# Patient Record
Sex: FEMALE | Race: BLACK OR AFRICAN AMERICAN | NOT HISPANIC OR LATINO | URBAN - METROPOLITAN AREA
[De-identification: names, ages, dates, MRNs, and addresses within clinical notes are randomized per-mention and may not be internally consistent; named-entity substitution may affect disease eponyms.]

---

## 2017-09-29 ENCOUNTER — EMERGENCY (EMERGENCY)
Facility: HOSPITAL | Age: 67
LOS: 1 days | Discharge: HOME | End: 2017-09-29
Attending: EMERGENCY MEDICINE | Admitting: EMERGENCY MEDICINE
Payer: MEDICARE

## 2017-09-29 VITALS
TEMPERATURE: 98 F | RESPIRATION RATE: 18 BRPM | DIASTOLIC BLOOD PRESSURE: 77 MMHG | HEART RATE: 67 BPM | OXYGEN SATURATION: 100 % | SYSTOLIC BLOOD PRESSURE: 153 MMHG

## 2017-09-29 VITALS
DIASTOLIC BLOOD PRESSURE: 61 MMHG | SYSTOLIC BLOOD PRESSURE: 91 MMHG | TEMPERATURE: 98 F | RESPIRATION RATE: 16 BRPM | HEART RATE: 73 BPM

## 2017-09-29 LAB
ALBUMIN SERPL ELPH-MCNC: 4 G/DL — SIGNIFICANT CHANGE UP (ref 3.3–5)
ALP SERPL-CCNC: 72 U/L — SIGNIFICANT CHANGE UP (ref 40–120)
ALT FLD-CCNC: 19 U/L — SIGNIFICANT CHANGE UP (ref 4–33)
AST SERPL-CCNC: 39 U/L — HIGH (ref 4–32)
BASOPHILS # BLD AUTO: 0.03 K/UL — SIGNIFICANT CHANGE UP (ref 0–0.2)
BASOPHILS NFR BLD AUTO: 0.3 % — SIGNIFICANT CHANGE UP (ref 0–2)
BILIRUB SERPL-MCNC: 0.2 MG/DL — SIGNIFICANT CHANGE UP (ref 0.2–1.2)
BUN SERPL-MCNC: 14 MG/DL — SIGNIFICANT CHANGE UP (ref 7–23)
CALCIUM SERPL-MCNC: 8.7 MG/DL — SIGNIFICANT CHANGE UP (ref 8.4–10.5)
CHLORIDE SERPL-SCNC: 103 MMOL/L — SIGNIFICANT CHANGE UP (ref 98–107)
CO2 SERPL-SCNC: 26 MMOL/L — SIGNIFICANT CHANGE UP (ref 22–31)
CREAT SERPL-MCNC: 0.88 MG/DL — SIGNIFICANT CHANGE UP (ref 0.5–1.3)
EOSINOPHIL # BLD AUTO: 0.09 K/UL — SIGNIFICANT CHANGE UP (ref 0–0.5)
EOSINOPHIL NFR BLD AUTO: 1 % — SIGNIFICANT CHANGE UP (ref 0–6)
GLUCOSE SERPL-MCNC: 122 MG/DL — HIGH (ref 70–99)
HCT VFR BLD CALC: 35.6 % — SIGNIFICANT CHANGE UP (ref 34.5–45)
HGB BLD-MCNC: 11.4 G/DL — LOW (ref 11.5–15.5)
IMM GRANULOCYTES # BLD AUTO: 0.02 # — SIGNIFICANT CHANGE UP
IMM GRANULOCYTES NFR BLD AUTO: 0.2 % — SIGNIFICANT CHANGE UP (ref 0–1.5)
LYMPHOCYTES # BLD AUTO: 3.36 K/UL — HIGH (ref 1–3.3)
LYMPHOCYTES # BLD AUTO: 38.5 % — SIGNIFICANT CHANGE UP (ref 13–44)
MCHC RBC-ENTMCNC: 30.4 PG — SIGNIFICANT CHANGE UP (ref 27–34)
MCHC RBC-ENTMCNC: 32 % — SIGNIFICANT CHANGE UP (ref 32–36)
MCV RBC AUTO: 94.9 FL — SIGNIFICANT CHANGE UP (ref 80–100)
MONOCYTES # BLD AUTO: 0.78 K/UL — SIGNIFICANT CHANGE UP (ref 0–0.9)
MONOCYTES NFR BLD AUTO: 8.9 % — SIGNIFICANT CHANGE UP (ref 2–14)
NEUTROPHILS # BLD AUTO: 4.45 K/UL — SIGNIFICANT CHANGE UP (ref 1.8–7.4)
NEUTROPHILS NFR BLD AUTO: 51.1 % — SIGNIFICANT CHANGE UP (ref 43–77)
NRBC # FLD: 0 — SIGNIFICANT CHANGE UP
PLATELET # BLD AUTO: 210 K/UL — SIGNIFICANT CHANGE UP (ref 150–400)
PMV BLD: 10.9 FL — SIGNIFICANT CHANGE UP (ref 7–13)
POTASSIUM SERPL-MCNC: 5.5 MMOL/L — HIGH (ref 3.5–5.3)
POTASSIUM SERPL-SCNC: 5.5 MMOL/L — HIGH (ref 3.5–5.3)
PROT SERPL-MCNC: 8.1 G/DL — SIGNIFICANT CHANGE UP (ref 6–8.3)
RBC # BLD: 3.75 M/UL — LOW (ref 3.8–5.2)
RBC # FLD: 13.5 % — SIGNIFICANT CHANGE UP (ref 10.3–14.5)
SODIUM SERPL-SCNC: 142 MMOL/L — SIGNIFICANT CHANGE UP (ref 135–145)
TSH SERPL-MCNC: 6.3 UIU/ML — HIGH (ref 0.27–4.2)
WBC # BLD: 8.73 K/UL — SIGNIFICANT CHANGE UP (ref 3.8–10.5)
WBC # FLD AUTO: 8.73 K/UL — SIGNIFICANT CHANGE UP (ref 3.8–10.5)

## 2017-09-29 PROCEDURE — 93010 ELECTROCARDIOGRAM REPORT: CPT

## 2017-09-29 PROCEDURE — 99284 EMERGENCY DEPT VISIT MOD MDM: CPT | Mod: 25

## 2017-09-29 RX ORDER — KETOROLAC TROMETHAMINE 30 MG/ML
15 SYRINGE (ML) INJECTION ONCE
Qty: 0 | Refills: 0 | Status: DISCONTINUED | OUTPATIENT
Start: 2017-09-29 | End: 2017-09-29

## 2017-09-29 RX ORDER — SODIUM CHLORIDE 9 MG/ML
1000 INJECTION INTRAMUSCULAR; INTRAVENOUS; SUBCUTANEOUS ONCE
Qty: 0 | Refills: 0 | Status: COMPLETED | OUTPATIENT
Start: 2017-09-29 | End: 2017-09-29

## 2017-09-29 RX ADMIN — Medication 15 MILLIGRAM(S): at 20:54

## 2017-09-29 RX ADMIN — Medication 15 MILLIGRAM(S): at 21:20

## 2017-09-29 RX ADMIN — SODIUM CHLORIDE 2000 MILLILITER(S): 9 INJECTION INTRAMUSCULAR; INTRAVENOUS; SUBCUTANEOUS at 20:54

## 2017-09-29 NOTE — ED PROVIDER NOTE - CARE PLAN
Principal Discharge DX:	Headache  Instructions for follow-up, activity and diet:	Follow up with your PMD within 48-72 hours- show copies of your reports given to you. Take Motrin 400mg every 6-8 hours with food for pain.  Follow up with your Neurology within the week.  Worsening, continued or new concerning symptoms return to the emergency department.

## 2017-09-29 NOTE — ED PROVIDER NOTE - MEDICAL DECISION MAKING DETAILS
66 y/o F with hx of HTN, CVA, chronic HA, is following neurologist Dr. Gastelum, presents with increased intensity to HA. Will obtain basic labs. Will give Toradol, valium, and fluids. Reassess.

## 2017-09-29 NOTE — ED ADULT NURSE NOTE - OBJECTIVE STATEMENT
68 y/o F co HA since yesterday. Pt states she has a hx of HA for 1 1/2 yrs.  HA is b/l temple regions and back of head.  Pt states had a MRI that showed stroke 1 year ago.  States difficulty ambulating since.  Gait is abnormal today.  Pt has equal strength, touch sensitivity and coordination.  Smile equal.  Denies fever/chills, chest pain/palpitations, SOB,  abdominal pain, N/V/D.

## 2017-09-29 NOTE — ED PROVIDER NOTE - PROGRESS NOTE DETAILS
CAROLA Goldstein Pt feeling better s/p meds and fluids. Labs stable however TSH slightly elevated. Copies of labs given to pt and instructed to follow up with PMD and Neuro

## 2017-09-29 NOTE — ED ADULT TRIAGE NOTE - CHIEF COMPLAINT QUOTE
Pt c/o headache  OVER A YEAR ; worse today with unsteady gait, dizziness, neck stiffness.  Denies nausea

## 2017-09-29 NOTE — ED PROVIDER NOTE - PLAN OF CARE
Follow up with your PMD within 48-72 hours- show copies of your reports given to you. Take Motrin 400mg every 6-8 hours with food for pain.  Follow up with your Neurology within the week.  Worsening, continued or new concerning symptoms return to the emergency department.

## 2017-09-29 NOTE — ED PROVIDER NOTE - CHPI ED SYMPTOMS NEG
no fever/no weakness/no vomiting/no numbness/no nausea/no dizziness/no trouble walking, no trouble speaking, no neck stiffness

## 2017-09-29 NOTE — ED PROVIDER NOTE - OBJECTIVE STATEMENT
66 y/o F with a PMHx of HTN, DM, right CVA 6 months ago, presents with worsening HA since yesterday. Pt states she has a hx of HA for 1 1/2 yrs. Pt is currently following neurologist Dr. Gastelum. Pt reports she has had multiple CT and MRI not explaining cause of HA. Pt reports taking valproic acid (500 mg, 2 tabs at bedtime). States HA has worsened in intensity in the b/l temple regions and back of head. Denies nausea, vomiting, photophobia, weakness, numbness, trouble speaking, trouble walking, fever, chills, neck stiffness or any other complaints. NKDA. 68 y/o F with a PMHx of HTN, DM, right CVA 6 months ago, presents with worsening HA since yesterday. Pt states she has a hx of HA for 1 1/2 yrs. Pt is currently following neurologist Dr. Gastelum. Pt reports she has had multiple CT and MRI not explaining cause of HA. Pt reports taking valproic acid (500 mg, 2 tabs at bedtime). States HA has worsened in intensity in the b/l temple regions and back of head. Denies nausea, vomiting, photophobia, weakness, numbness, trouble speaking, trouble walking, fever, chills, neck stiffness or any other complaints. NKDA. Appears well.

## 2018-05-11 NOTE — ED ADULT TRIAGE NOTE - PAIN RATING/NUMBER SCALE (0-10): REST
Problem: Patient Care Overview  Goal: Discharge Needs Assessment   met with patient today at her request. She stated that she has made an appointment for a Rule 25 assessment on Wednesday 05/16/18 at 12:00 pm at [facility]. She stated that she has spoken to [facility] and they are requesting records from the hospital. She stated that they are aware that her Rule 25 assessment will not be done until next week. She stated that once they have records on her that they will put her on a wait list for a bed at [facility].  faxed records to [facility] this afternoon.        10
